# Patient Record
Sex: FEMALE | Race: BLACK OR AFRICAN AMERICAN | NOT HISPANIC OR LATINO | Employment: FULL TIME | URBAN - METROPOLITAN AREA
[De-identification: names, ages, dates, MRNs, and addresses within clinical notes are randomized per-mention and may not be internally consistent; named-entity substitution may affect disease eponyms.]

---

## 2020-07-10 PROBLEM — E66.811 OBESITY, CLASS I, BMI 30-34.9: Status: ACTIVE | Noted: 2020-07-10

## 2024-07-10 ENCOUNTER — PATIENT MESSAGE (OUTPATIENT)
Dept: ALLERGY | Facility: CLINIC | Age: 54
End: 2024-07-10
Payer: COMMERCIAL

## 2024-07-14 ENCOUNTER — ANESTHESIA EVENT (OUTPATIENT)
Dept: SURGERY | Facility: HOSPITAL | Age: 54
End: 2024-07-14
Payer: COMMERCIAL

## 2024-07-15 ENCOUNTER — HOSPITAL ENCOUNTER (OUTPATIENT)
Facility: HOSPITAL | Age: 54
Discharge: HOME | End: 2024-07-15
Attending: UROLOGY | Admitting: UROLOGY
Payer: COMMERCIAL

## 2024-07-15 ENCOUNTER — TELEPHONE (OUTPATIENT)
Dept: CARDIOLOGY | Facility: CLINIC | Age: 54
End: 2024-07-15
Payer: COMMERCIAL

## 2024-07-15 ENCOUNTER — ANESTHESIA (OUTPATIENT)
Dept: SURGERY | Facility: HOSPITAL | Age: 54
End: 2024-07-15
Payer: COMMERCIAL

## 2024-07-15 DIAGNOSIS — N20.0 KIDNEY STONE: Primary | ICD-10-CM

## 2024-07-15 PROCEDURE — 52351 CYSTOURETERO & OR PYELOSCOPE: CPT | Mod: ,,, | Performed by: UROLOGY

## 2024-07-15 PROCEDURE — 63600175 PHARM REV CODE 636 W HCPCS: Performed by: UROLOGY

## 2024-07-15 PROCEDURE — 25000003 PHARM REV CODE 250: Performed by: NURSE ANESTHETIST, CERTIFIED REGISTERED

## 2024-07-15 PROCEDURE — 37000008 HC ANESTHESIA 1ST 15 MINUTES: Performed by: UROLOGY

## 2024-07-15 PROCEDURE — C1758 CATHETER, URETERAL: HCPCS | Performed by: UROLOGY

## 2024-07-15 PROCEDURE — 71000033 HC RECOVERY, INTIAL HOUR: Performed by: UROLOGY

## 2024-07-15 PROCEDURE — 36000707: Performed by: UROLOGY

## 2024-07-15 PROCEDURE — 25000003 PHARM REV CODE 250: Performed by: ANESTHESIOLOGY

## 2024-07-15 PROCEDURE — C1747 OPTIME ENDOSCOPE, SINGLE, URINARY TRACT: HCPCS | Performed by: UROLOGY

## 2024-07-15 PROCEDURE — 63600175 PHARM REV CODE 636 W HCPCS: Performed by: NURSE ANESTHETIST, CERTIFIED REGISTERED

## 2024-07-15 PROCEDURE — C1769 GUIDE WIRE: HCPCS | Performed by: UROLOGY

## 2024-07-15 PROCEDURE — 36000706: Performed by: UROLOGY

## 2024-07-15 PROCEDURE — C1894 INTRO/SHEATH, NON-LASER: HCPCS | Performed by: UROLOGY

## 2024-07-15 PROCEDURE — C2617 STENT, NON-COR, TEM W/O DEL: HCPCS | Performed by: UROLOGY

## 2024-07-15 PROCEDURE — 37000009 HC ANESTHESIA EA ADD 15 MINS: Performed by: UROLOGY

## 2024-07-15 PROCEDURE — 63600175 PHARM REV CODE 636 W HCPCS: Performed by: ANESTHESIOLOGY

## 2024-07-15 PROCEDURE — 52332 CYSTOSCOPY AND TREATMENT: CPT | Mod: RT,,, | Performed by: UROLOGY

## 2024-07-15 PROCEDURE — 71000015 HC POSTOP RECOV 1ST HR: Performed by: UROLOGY

## 2024-07-15 DEVICE — STENT URETERAL UNIV 7FR 24CM: Type: IMPLANTABLE DEVICE | Site: URETER | Status: FUNCTIONAL

## 2024-07-15 RX ORDER — ONDANSETRON HYDROCHLORIDE 2 MG/ML
4 INJECTION, SOLUTION INTRAVENOUS ONCE AS NEEDED
Status: DISCONTINUED | OUTPATIENT
Start: 2024-07-15 | End: 2024-07-15 | Stop reason: HOSPADM

## 2024-07-15 RX ORDER — OXYCODONE AND ACETAMINOPHEN 5; 325 MG/1; MG/1
1 TABLET ORAL
Status: DISCONTINUED | OUTPATIENT
Start: 2024-07-15 | End: 2024-07-15 | Stop reason: HOSPADM

## 2024-07-15 RX ORDER — TAMSULOSIN HYDROCHLORIDE 0.4 MG/1
0.4 CAPSULE ORAL DAILY
Qty: 30 CAPSULE | Refills: 0 | Status: SHIPPED | OUTPATIENT
Start: 2024-07-15 | End: 2024-08-14

## 2024-07-15 RX ORDER — ROCURONIUM BROMIDE 10 MG/ML
INJECTION, SOLUTION INTRAVENOUS
Status: DISCONTINUED | OUTPATIENT
Start: 2024-07-15 | End: 2024-07-15

## 2024-07-15 RX ORDER — HYDROMORPHONE HYDROCHLORIDE 2 MG/ML
0.2 INJECTION, SOLUTION INTRAMUSCULAR; INTRAVENOUS; SUBCUTANEOUS EVERY 5 MIN PRN
Status: DISCONTINUED | OUTPATIENT
Start: 2024-07-15 | End: 2024-07-15 | Stop reason: HOSPADM

## 2024-07-15 RX ORDER — OXYCODONE HYDROCHLORIDE 5 MG/1
5 TABLET ORAL EVERY 6 HOURS PRN
Qty: 30 TABLET | Refills: 0 | Status: SHIPPED | OUTPATIENT
Start: 2024-07-15

## 2024-07-15 RX ORDER — LIDOCAINE HYDROCHLORIDE 20 MG/ML
INJECTION INTRAVENOUS
Status: DISCONTINUED | OUTPATIENT
Start: 2024-07-15 | End: 2024-07-15

## 2024-07-15 RX ORDER — ONDANSETRON HYDROCHLORIDE 2 MG/ML
4 INJECTION, SOLUTION INTRAVENOUS DAILY PRN
Status: DISCONTINUED | OUTPATIENT
Start: 2024-07-15 | End: 2024-07-15 | Stop reason: HOSPADM

## 2024-07-15 RX ORDER — PHENAZOPYRIDINE HYDROCHLORIDE 100 MG/1
100 TABLET, FILM COATED ORAL 3 TIMES DAILY PRN
Qty: 30 TABLET | Refills: 0 | Status: SHIPPED | OUTPATIENT
Start: 2024-07-15 | End: 2024-07-25

## 2024-07-15 RX ORDER — SUCCINYLCHOLINE CHLORIDE 20 MG/ML
INJECTION INTRAMUSCULAR; INTRAVENOUS
Status: DISCONTINUED | OUTPATIENT
Start: 2024-07-15 | End: 2024-07-15

## 2024-07-15 RX ORDER — ONDANSETRON HYDROCHLORIDE 2 MG/ML
INJECTION, SOLUTION INTRAVENOUS
Status: DISCONTINUED | OUTPATIENT
Start: 2024-07-15 | End: 2024-07-15

## 2024-07-15 RX ORDER — MIDAZOLAM HYDROCHLORIDE 1 MG/ML
INJECTION INTRAMUSCULAR; INTRAVENOUS
Status: DISCONTINUED | OUTPATIENT
Start: 2024-07-15 | End: 2024-07-15

## 2024-07-15 RX ORDER — FENTANYL CITRATE 50 UG/ML
INJECTION, SOLUTION INTRAMUSCULAR; INTRAVENOUS
Status: DISCONTINUED | OUTPATIENT
Start: 2024-07-15 | End: 2024-07-15

## 2024-07-15 RX ORDER — DEXAMETHASONE SODIUM PHOSPHATE 4 MG/ML
INJECTION, SOLUTION INTRA-ARTICULAR; INTRALESIONAL; INTRAMUSCULAR; INTRAVENOUS; SOFT TISSUE
Status: DISCONTINUED | OUTPATIENT
Start: 2024-07-15 | End: 2024-07-15

## 2024-07-15 RX ORDER — PHENYLEPHRINE HYDROCHLORIDE 10 MG/ML
INJECTION INTRAVENOUS
Status: DISCONTINUED | OUTPATIENT
Start: 2024-07-15 | End: 2024-07-15

## 2024-07-15 RX ORDER — PROPOFOL 10 MG/ML
VIAL (ML) INTRAVENOUS
Status: DISCONTINUED | OUTPATIENT
Start: 2024-07-15 | End: 2024-07-15

## 2024-07-15 RX ORDER — FENTANYL CITRATE 50 UG/ML
25 INJECTION, SOLUTION INTRAMUSCULAR; INTRAVENOUS EVERY 5 MIN PRN
Status: DISCONTINUED | OUTPATIENT
Start: 2024-07-15 | End: 2024-07-15 | Stop reason: HOSPADM

## 2024-07-15 RX ORDER — CEFAZOLIN SODIUM 1 G/3ML
INJECTION, POWDER, FOR SOLUTION INTRAMUSCULAR; INTRAVENOUS
Status: DISCONTINUED | OUTPATIENT
Start: 2024-07-15 | End: 2024-07-15

## 2024-07-15 RX ORDER — SOLIFENACIN SUCCINATE 5 MG/1
5 TABLET, FILM COATED ORAL DAILY
Qty: 30 TABLET | Refills: 0 | Status: SHIPPED | OUTPATIENT
Start: 2024-07-15 | End: 2024-08-14

## 2024-07-15 RX ORDER — MEPERIDINE HYDROCHLORIDE 25 MG/ML
12.5 INJECTION INTRAMUSCULAR; INTRAVENOUS; SUBCUTANEOUS ONCE AS NEEDED
Status: DISCONTINUED | OUTPATIENT
Start: 2024-07-15 | End: 2024-07-15 | Stop reason: HOSPADM

## 2024-07-15 RX ORDER — KETOROLAC TROMETHAMINE 30 MG/ML
15 INJECTION, SOLUTION INTRAMUSCULAR; INTRAVENOUS EVERY 6 HOURS PRN
Status: DISCONTINUED | OUTPATIENT
Start: 2024-07-15 | End: 2024-07-15 | Stop reason: HOSPADM

## 2024-07-15 RX ADMIN — PROPOFOL 150 MG: 10 INJECTION, EMULSION INTRAVENOUS at 07:07

## 2024-07-15 RX ADMIN — ROCURONIUM BROMIDE 5 MG: 10 INJECTION, SOLUTION INTRAVENOUS at 07:07

## 2024-07-15 RX ADMIN — SODIUM CHLORIDE, SODIUM LACTATE, POTASSIUM CHLORIDE, AND CALCIUM CHLORIDE: .6; .31; .03; .02 INJECTION, SOLUTION INTRAVENOUS at 06:07

## 2024-07-15 RX ADMIN — PHENYLEPHRINE HYDROCHLORIDE 100 MCG: 10 INJECTION INTRAVENOUS at 07:07

## 2024-07-15 RX ADMIN — ONDANSETRON 4 MG: 2 INJECTION INTRAMUSCULAR; INTRAVENOUS at 07:07

## 2024-07-15 RX ADMIN — FENTANYL CITRATE 100 MCG: 50 INJECTION, SOLUTION INTRAMUSCULAR; INTRAVENOUS at 07:07

## 2024-07-15 RX ADMIN — CEFAZOLIN 2 G: 330 INJECTION, POWDER, FOR SOLUTION INTRAMUSCULAR; INTRAVENOUS at 07:07

## 2024-07-15 RX ADMIN — KETOROLAC TROMETHAMINE 15 MG: 30 INJECTION, SOLUTION INTRAMUSCULAR at 08:07

## 2024-07-15 RX ADMIN — DEXAMETHASONE SODIUM PHOSPHATE 4 MG: 4 INJECTION, SOLUTION INTRA-ARTICULAR; INTRALESIONAL; INTRAMUSCULAR; INTRAVENOUS; SOFT TISSUE at 07:07

## 2024-07-15 RX ADMIN — HYDROMORPHONE HYDROCHLORIDE 0.2 MG: 2 INJECTION INTRAMUSCULAR; INTRAVENOUS; SUBCUTANEOUS at 08:07

## 2024-07-15 RX ADMIN — MIDAZOLAM HYDROCHLORIDE 2 MG: 1 INJECTION, SOLUTION INTRAMUSCULAR; INTRAVENOUS at 06:07

## 2024-07-15 RX ADMIN — LIDOCAINE HYDROCHLORIDE 50 MG: 20 INJECTION INTRAVENOUS at 07:07

## 2024-07-15 RX ADMIN — OXYCODONE HYDROCHLORIDE AND ACETAMINOPHEN 1 TABLET: 5; 325 TABLET ORAL at 08:07

## 2024-07-15 RX ADMIN — SUCCINYLCHOLINE CHLORIDE 120 MG: 20 INJECTION, SOLUTION INTRAMUSCULAR; INTRAVENOUS; PARENTERAL at 07:07

## 2024-07-15 NOTE — OP NOTE
[facility]  Operative Note    Date: 07/15/2024    Pre-Op Diagnosis: right renal stone    Post-Op Diagnosis: same    Procedure(s) Performed:   1.  Right diagnostic ureteroscopy  2.  Cystoscopy  3.  Placement of a right 7Fr x 24 cm JJ ureteral stent with strings removed  4.  Fluoro < 1 h    Specimen(s): none    Surgeon: Abdoulaye Mchugh MD    Anesthesia: General endotracheal anesthesia    Indications: Rachel Clark is a 54 y.o. female with a right renal stone, presenting for definitive stone management.  She currently does not have a JJ ureteral stent in place.      Findings: very tight proximal ureter, would not accommodate the dual lumen, access sheath, or flexible ureteroscope, stent placed without strings    Estimated Blood Loss: min    Drains: 7 Fr x 24 cm JJ ureteral stent without strings    Procedure in detail:  After informed consent was obtained, the patient was brought the the cystoscopy suite and placed in the supine position.  SCDs were applied and working.  Anesthesia was administered.  The patient was then placed in the dorsal lithotomy position and prepped and draped in the usual sterile fashion.      A rigid cystoscope in a 22 Fr sheath was introduced into the patient's urethra.  This passed easily.  The entire urethra was visualized which showed no strictures or masses.  Formal cystoscopy was performed which revealed no masses or lesions suspicious for malignancy, no bladder stones, no bladder diverticuli, no trabeculations.  The ureteral orifices were visualized in the normal anatomic position bilaterally and efflux was visualized.      A Motion wire was passed up the right ureteral orifice and up into the kidney.  This passed easily and placement was confirmed using fluoro.  The cystoscope was removed keeping the guidewire in place and the wire was secured to the drape.      A dual lumen catheter was used to gently dilate the distal ureter, this was advanced up to the  proximal ureter under fluoro. A second wire was inserted and the duel lumen was removed. A 10/12 Fr 35cm ureteral access sheath was inserted under fluoro without difficulty but could not be advanced past the midureter. A [employer] disposable ureteroscope was inserted and advanced proximally. Unfortunately, there was an area of narrowed ureter that would not accommodate the scope. The decision was made to place a stent and return for stone removal at a later date. The ureteroscope and ureteral access sheath were removed under direct vision, there was no evidence of ureteral injury or perforation. The bladder was drained with cystoscope removed.     Over the indwelling wire, a 7 Fr x 24 cm JJ ureteral stent without strings was passed over the wire and up into the renal pelvis using fluoro.  When the coil appeared to be in good position in the kidney and the radio-opaque marker of the pusher was at the inferior pubis, the wire was removed under continuous fluoro.  Good coils were seen in the kidney and the bladder using fluoro.      The patient tolerated the procedure well and was transferred to the recovery room in stable condition.      Disposition:  The patient will follow up with me in the OR for stone removal 07/31    Abdoulaye Mchugh MD

## 2024-07-15 NOTE — DISCHARGE SUMMARY
[facility] ([facility])  Discharge Note  Short Stay    Procedure(s) (LRB):  CYSTOURETEROSCOPY (Right)  STENT PLACEMENT/PRIOR TO JIMBO (Right)      OUTCOME: Patient tolerated treatment/procedure well without complication and is now ready for discharge.    DISPOSITION: Home or Self Care    FINAL DIAGNOSIS:  Kidney stone    FOLLOWUP: In clinic    DISCHARGE INSTRUCTIONS:    Discharge Procedure Orders   Diet Adult Regular     Notify your health care provider if you experience any of the following:  temperature >100.4     Notify your health care provider if you experience any of the following:  persistent nausea and vomiting or diarrhea     Notify your health care provider if you experience any of the following:  severe uncontrolled pain     Notify your health care provider if you experience any of the following:  difficulty breathing or increased cough     Notify your health care provider if you experience any of the following:  severe persistent headache     Notify your health care provider if you experience any of the following:  worsening rash     Notify your health care provider if you experience any of the following:  persistent dizziness, light-headedness, or visual disturbances     Notify your health care provider if you experience any of the following:  increased confusion or weakness     Activity as tolerated

## 2024-07-15 NOTE — ANESTHESIA POSTPROCEDURE EVALUATION
Anesthesia Post Evaluation    Patient: Rachel Clark    Procedure(s) Performed: Procedure(s) (LRB):  CYSTOURETEROSCOPY (Right)  STENT PLACEMENT/PRIOR TO JIMBO (Right)    Final Anesthesia Type: general      Patient location during evaluation: PACU  Patient participation: Yes- Able to Participate  Level of consciousness: awake and alert, oriented and awake  Post-procedure vital signs: reviewed and stable  Pain management: adequate  Airway patency: patent    PONV status at discharge: No PONV  Anesthetic complications: no      Cardiovascular status: blood pressure returned to baseline  Respiratory status: unassisted  Hydration status: euvolemic  Follow-up not needed.              Vitals Value Taken Time   /73 07/15/24 0815   Temp 36.6 °C (97.9 °F) 07/15/24 0747   Pulse 84 07/15/24 0816   Resp 19 07/15/24 0816   SpO2 94 % 07/15/24 0816   Vitals shown include unfiled device data.      No case tracking events are documented in the log.      Pain/Nasim Score: Nasim Score: 8 (7/15/2024  8:00 AM)

## 2024-07-15 NOTE — PLAN OF CARE
POC and discharge instructions discussed with patient and family, no questions noted.    
Patient  ready for surgery. Family at bedside. Belongings given to family to secure. Patient instructed on Preop Process verbalizes understanding.    
Statement Selected

## 2024-07-15 NOTE — TELEPHONE ENCOUNTER
Spoke with pt and discussed echo results. Pt verbalized understanding and will call back with any further questions or concerns.     ----- Message from Abdulkadir Byers MD sent at 7/13/2024  9:30 AM CDT -----  Echo shows normal systolic function

## 2024-07-15 NOTE — ANESTHESIA PREPROCEDURE EVALUATION
07/14/2024  Rachel Clark is a 54 y.o., female.      Pre-op Assessment    I have reviewed the Patient Summary Reports.     I have reviewed the Nursing Notes. I have reviewed the NPO Status.      Review of Systems  Anesthesia Hx:  No problems with previous Anesthesia                Hematology/Oncology:  Hematology Normal   Oncology Normal                                   Pulmonary:    Asthma                    Renal/:  Renal/ Normal                 Hepatic/GI:  Hepatic/GI Normal                 Neurological:  Neurology Normal                                      Endocrine:  Endocrine Normal            Dermatological:  Skin Normal    Psych:  Psychiatric History                  Physical Exam  General: Well nourished, Cooperative, Alert and Oriented    Airway:  Mallampati: II / II  Mouth Opening: Normal  TM Distance: Normal  Tongue: Normal  Neck ROM: Normal ROM    Dental:  Intact      Patient Active Problem List   Diagnosis    Anemia    Mild intermittent asthma without complication    Anxiety disorder    Trigger index finger of left hand    Prediabetes    Obesity, Class I, BMI 30-34.9    Chest pain    Chronic idiopathic urticaria    Chronic rhinitis    Post-nasal drip         Anesthesia Plan  Type of Anesthesia, risks & benefits discussed:    Anesthesia Type: Gen ETT, Gen Supraglottic Airway  Intra-op Monitoring Plan: Standard ASA Monitors  Post Op Pain Control Plan: multimodal analgesia  Induction:  IV  Airway Plan: Direct  Informed Consent: Informed consent signed with the Patient and all parties understand the risks and agree with anesthesia plan.  All questions answered.   ASA Score: 2  Day of Surgery Review of History & Physical: H&P Update referred to the surgeon/provider.I have interviewed and examined the patient. I have reviewed the patient's H&P dated: There are no significant changes.  H&P completed by Anesthesiologist.    Ready For Surgery From Anesthesia Perspective.     .

## 2024-07-15 NOTE — ANESTHESIA PROCEDURE NOTES
Intubation    Date/Time: 7/15/2024 7:07 AM    Performed by: Edgardo Chilel CRNA  Authorized by: Rajeev Harrison MD    Intubation:     Induction:  Intravenous    Intubated:  Postinduction    Mask Ventilation:  Easy mask    Attempts:  2    Attempted By:  CRNA    Method of Intubation:  Direct    Blade:  Kimble 2    Laryngeal View Grade: Grade III - only epiglottis visible      Attempted By (2nd Attempt):  CRNA    Method of Intubation (2nd Attempt):  Video laryngoscopy    Blade (2nd Attempt):  Bean 3    Laryngeal View Grade (2nd Attempt): Grade I - full view of cords      Difficult Airway Encountered?: No      Complications:  None    Airway Device:  Oral endotracheal tube    Airway Device Size:  7.5    Style/Cuff Inflation:  Cuffed (inflated to minimal occlusive pressure)    Tube secured:  21    Secured at:  The lips    Placement Verified By:  Capnometry    Complicating Factors:  None    Findings Post-Intubation:  BS equal bilateral

## 2024-07-15 NOTE — TRANSFER OF CARE
"Anesthesia Transfer of Care Note    Patient: Rachel Clark    Procedure(s) Performed: Procedure(s) (LRB):  CYSTOURETEROSCOPY (Right)  STENT PLACEMENT/PRIOR TO JIMBO (Right)    Patient location: PACU    Anesthesia Type: general    Transport from OR: Transported from OR on room air with adequate spontaneous ventilation    Post pain: adequate analgesia    Post assessment: no apparent anesthetic complications    Post vital signs: stable    Level of consciousness: responds to stimulation    Nausea/Vomiting: no nausea/vomiting    Complications: none    Transfer of care protocol was followed      Last vitals: Visit Vitals  /69 (BP Location: Right arm, Patient Position: Sitting)   Pulse 86   Temp 36.3 °C (97.3 °F) (Temporal)   Resp 18   Ht 5' 2" (1.575 m)   Wt 84.9 kg (187 lb 2.7 oz)   LMP 06/01/2014   SpO2 99%   Breastfeeding No   BMI 34.23 kg/m²     "

## 2024-07-15 NOTE — H&P
Chief Complaint:  Right flank pain     HPI:   Rachel Clark is a 53 y.o. female that presents today as a referral from [facility] for right flank pain.  Patient notes she began having right-sided flank pain and kind of a bulge feeling about three weeks ago.  Patient notes that pain would last for about a 2nd and then resolve without further intervention.  Notes that it would occur when she moves around.  No new voiding issues or gross hematuria.  KUB showed a calcification overlying the right kidney.  No nausea or vomiting.  No family history of kidney stones or  cancers.        PMH:       Past Medical History:   Diagnosis Date    Allergy      Anemia      Anxiety      Asthma      Bronchitis      Colon polyp     Fibroids      Vertigo           PSH:        Past Surgical History:   Procedure Laterality Date    ADENOIDECTOMY        BREAST BIOPSY Left      negative     SECTION         x 1    COLONOSCOPY N/A 2020     Procedure: COLONOSCOPY;  Surgeon: Marcus Calderon MD;  Location: [facility];  Service: Endoscopy;  Laterality: N/A;    HYSTERECTOMY         [facility] 2014 for Fibroids    TONSILLECTOMY             Family History:         Family History   Problem Relation Name Age of Onset    Hypertension Mother        Breast cancer Cousin        Stroke Father        Cancer Father             lung    Ovarian cancer Maternal Cousin             Social History:  Social History   Social History            Tobacco Use    Smoking status: Never       Passive exposure: Never    Smokeless tobacco: Never   Substance Use Topics    Alcohol use: No    Drug use: No            Review of Systems:  General: No fever, chills  Skin: No rashes  Chest:  Denies cough and sputum production  Heart: Denies chest pain  Resp: Denies dyspnea  Abdomen: Denies diarrhea, abdominal pain, hematemesis, or blood in stool.  Musculoskeletal: No joint stiffness or swelling. Denies back pain.  : see HPI  Neuro: no dizziness or  weakness     Allergies:  Patient has no known allergies.     Medications:    Current Medications      Current Outpatient Medications:     albuterol (VENTOLIN HFA) 90 mcg/actuation inhaler, Inhale 1-2 puffs into the lungs every 4 to 6 hours as needed for Wheezing or Shortness of Breath. Rescue, Disp: 18 g, Rfl: 1    famotidine (PEPCID) 20 MG tablet, Take 1 tablet (20 mg total) by mouth 2 (two) times daily., Disp: 30 tablet, Rfl: 0    fexofenadine (ALLEGRA) 180 MG tablet, Take 1 tablet (180 mg total) by mouth once daily., Disp: 14 tablet, Rfl: 0    hydrOXYzine pamoate (VISTARIL) 25 MG Cap, Take 1 capsule (25 mg total) by mouth nightly as needed (skin itching)., Disp: 30 capsule, Rfl: 0        Physical Exam:      Vitals:     05/30/24 1517   BP: 106/71   Pulse: 79   Resp: 16      There is no height or weight on file to calculate BMI.  General: awake, alert, cooperative  Head: NC/AT  Ears: external ears normal  Eyes: sclera normal  Lungs: normal inspiration, NAD  Heart: well-perfused  Abdomen: Soft, NT, ND  Back: No CVA TTP, no spine TTP  Skin: The skin is warm and dry  Ext: No c/c/e.  Neuro: grossly intact, AOx3     RADIOLOGY:  XR ABDOMEN FLAT AND ERECT   05/27/2024     CLINICAL HISTORY:  Unspecified abdominal pain     TECHNIQUE:  Flat and erect AP views of the abdomen were preformed.     COMPARISON:  07/25/2019     FINDINGS:  Bowel gas pattern is nonspecific.  A moderate volume of stool is present throughout the colon suggesting possible constipation.  No definite evidence of obstruction.  No pneumoperitoneum demonstrated. There is a 10 mm calcific density projecting over the expected location of the right renal hilum which is suspicious for stone, potentially involving the UPJ.  Visualized lung bases appear clear.     Impression:     1. Suspected right-sided nephrolithiasis as above.  Further characterization could be obtained with CT.  2. Possible constipation      CT RENAL STONE STUDY ABD PELVIS WO     CLINICAL  HISTORY:  Flank pain, kidney stone suspected; Unspecified abdominal pain     TECHNIQUE:  Low dose axial images, sagittal and coronal reformations were obtained from the lung bases to the pubic symphysis.  Oral contrast was not administered.     COMPARISON:  05/27/2024     FINDINGS:  Heart: Normal size. No effusion.     Lung Bases: Clear.     Liver: Normal size and attenuation. No focal lesions.     Gallbladder: No calcified gallstones.     Bile Ducts: No dilatation.     Pancreas: No obvious mass. No peripancreatic fat stranding.     Spleen: Normal.     Adrenals: Normal.     Kidneys/Ureters: Mild cortical thinning.  Right kidney appears somewhat atrophic.  Suspect cyst upper pole right kidney not well visualized.  Non mm nonobstructing stone within the lower pole right kidney.  No mass, hydroureteronephrosis, or nephroureterolithiasis.     Bladder: No wall thickening.     Reproductive organs: Normal.     GI Tract/Mesentery: No evidence of bowel obstruction or inflammation.  No evidence of appendicitis.     Peritoneal Space: No ascites or free air.     Retroperitoneum: No significant adenopathy.     Abdominal wall: Small fat containing umbilical hernia.     Vasculature: No aneurysm.     Bones: No acute fracture.  Mild facet arthropathy lower lumbar spine.  No suspicious lytic or sclerotic lesions.     Impression:     No acute abnormalities     LABS:  I personally reviewed the following lab values:        Lab Results   Component Value Date     WBC 7.81 05/27/2024     HGB 11.1 (L) 05/27/2024     HCT 35.6 (L) 05/27/2024      05/27/2024      05/27/2024     K 3.9 05/27/2024      05/27/2024     CREATININE 1.0 05/27/2024     BUN 17 05/27/2024     CO2 26 05/27/2024     TSH 2.845 09/28/2023     GLUF 86 04/16/2008     HGBA1C 6.0 (H) 04/24/2024     CHOL 165 09/28/2023     TRIG 76 09/28/2023     HDL 41 09/28/2023     ALT 16 05/27/2024     AST 14 05/27/2024      Assessment/Plan:   Rachel Clark is a 54  y.o. female with:     Right renal stone - to OR for right URS/LL, risks/benefits/alternatives reviewed        Abdoulaye Mchugh MD  Urology

## 2024-07-16 VITALS
TEMPERATURE: 98 F | SYSTOLIC BLOOD PRESSURE: 122 MMHG | WEIGHT: 187.19 LBS | OXYGEN SATURATION: 94 % | HEART RATE: 81 BPM | HEIGHT: 62 IN | DIASTOLIC BLOOD PRESSURE: 80 MMHG | RESPIRATION RATE: 30 BRPM | BODY MASS INDEX: 34.45 KG/M2

## 2024-07-26 ENCOUNTER — TELEPHONE (OUTPATIENT)
Dept: UROLOGY | Facility: CLINIC | Age: 54
End: 2024-07-26
Payer: COMMERCIAL

## 2024-07-26 NOTE — TELEPHONE ENCOUNTER
Spoke with her, fever and chills come and go (100.7,102.101), she is rotating Tylenol and Motrin, I advised her to go to ER.     ----- Message from Wellington Dee MD sent at 7/26/2024 12:37 PM CDT -----  Contact: Rachel  If febrile should be seen, and may need to come to ER.  How high is the temp?  ----- Message -----  From: Milanes Flores, Loraine, RN  Sent: 7/26/2024  11:11 AM CDT  To: Wellington Dee MD    Patient had surgery on July 15 by Dr Mchugh (CYSTOURETEROSCOPY), and she is having fever, chills and sharp pain, any other reason beside the stent placement?   Thanks!  ----- Message -----  From: Layo See  Sent: 7/26/2024   9:58 AM CDT  To: Jeison ZUÑIGA Staff    Type:  Needs Medical Advice    Who Called: Rachel  Symptoms (please be specific):  sharp pain,chills and fever    How long has patient had these symptoms: started yesterday 7/26  Pharmacy name and phone #:   [facility] [address] [place], [place] - [address] [facility]  [address] [facility] [address]  Phone: [phone] Fax: [phone]  Would the patient rather a call back or a response via [facility]? Call back  Best Call Back Number:  [phone]  Additional Information: Patient wants to know if its related to her recent surgery  Thanks   Am

## 2024-07-31 ENCOUNTER — TELEPHONE (OUTPATIENT)
Dept: UROLOGY | Facility: CLINIC | Age: 54
End: 2024-07-31
Payer: COMMERCIAL

## 2024-07-31 DIAGNOSIS — N20.0 RENAL STONE: ICD-10-CM

## 2024-07-31 DIAGNOSIS — N20.0 KIDNEY STONE: Primary | ICD-10-CM

## 2024-07-31 RX ORDER — CEFAZOLIN SODIUM 2 G/50ML
2 SOLUTION INTRAVENOUS
Status: CANCELLED | OUTPATIENT
Start: 2024-07-31

## 2024-07-31 NOTE — TELEPHONE ENCOUNTER
Message sent to Dr Mchugh       ----- Message from Layne Hightower sent at 7/31/2024  3:26 PM CDT -----  Contact: Rachel Godwin is calling to receive a call back at .[phone]. Reports having surgery on 7/15 and the surgery was supposed to be repeated. Pt checking on status of procedure scheduling.

## 2024-07-31 NOTE — TELEPHONE ENCOUNTER
Called the patient, after verification of name and , the patient was informed  that about sx date. She agreed. Pt informed that pre admit will call with a time of arrival. She voiced understanding       ----- Message from Penemarie K Murphy Jaylan sent at 2024  3:51 PM CDT -----  Regarding: self  Type: Patient Call Back       Who called: joselin        What is the request in detail: wouldl colleen a call back        Can the clinic reply by [facility]? yes      Would the patient rather a call back or a response via [facility]? Call back       Best call back number:[phone]

## 2024-08-01 ENCOUNTER — TELEPHONE (OUTPATIENT)
Dept: UROLOGY | Facility: CLINIC | Age: 54
End: 2024-08-01
Payer: COMMERCIAL

## 2024-08-01 ENCOUNTER — TELEPHONE (OUTPATIENT)
Dept: PREADMISSION TESTING | Facility: HOSPITAL | Age: 54
End: 2024-08-01
Payer: COMMERCIAL

## 2024-08-01 NOTE — TELEPHONE ENCOUNTER
Called the patient, after verification of name and , the patient was informed  that a letter for her job was put in on her portal she voiced understanding         ----- Message from Milton Roberto sent at 2024  3:02 PM CDT -----  Contact: Rachel  .Type:  Needs Medical Advice    Who Called:  Rachel     Would the patient rather a call back or a response via [facility]?  Call back   Best Call Back Number:  [phone] (home)    Additional Information:  Pt would like a call back to discuss questions pertaining to the upcoming procedure      Thanks

## 2024-08-01 NOTE — LETTER
August 1, 2024    Rachel Clark  [address] [place] [address]             [facility]  [address] [facility]  [place] [address]  Phone: [phone]  Fax: [phone] Rachel Clakr will be having surgery at [facility] on 8/2/24. Due to being booked up for a few weeks and not having OR time available on my schedule, I was able to get Ms Ramos scheduled on a day that became available unexpectedly due to cancellations. It is  important for her to have this surgery sooner rather than later and this date (8/2) was offered to her.      If you have any questions or concerns, please don't hesitate to call.    Sincerely,        Abdoulaye Mchugh MD

## 2024-08-01 NOTE — TELEPHONE ENCOUNTER
Pre op instructions reviewed with patient over telephone, verbalized understanding.     To confirm, Surgery is scheduled on 8/02/24, please arrive at 1:15pm. We will call you after 2pm the day before Surgery with your arrival time.     *Please report to the [facility]obby (1st Floor) located off of [facility] (2nd Entrance/Building on the left, in front of the flag pole).  Address: [address] [place] [address] [place] [address]      INSTRUCTIONS IMPORTANT!!!  DO NOT Eat, Drink, or Smoke after 12 midnight unless instructed otherwise by your Surgeon. OK to brush teeth, no gum, candy or mints!     >>>MEDICATION INSTRUCTIONS<<<: Morning of Surgery, take small sip of water with ONLY these medications:  FLONASE Nasal [place], if needed    >>>>BRING ALBUTEROL INHALER TO PRE OP DAY OF SURGERY<<<     If you take Ozempic/ Mounjaro / Wegovy / Trulicity / Semaglutide, any weight loss injections OR PHENTERMINE --->>> PLEASE LET US KNOW IMMEDIATELY, as these medications need to be stopped 7 days prior to surgery!    *Patients should HOLD all vitamins, herbal supplements, weight loss medication, aspirin products & NSAIDS 7 days prior to surgery, as these can thin the blood. Ok to take Tylenol.     ____  Avoid Alcoholic beverages 3 days prior to surgery, as it can thin the blood.  ____  NO Acrylic/fake nails or nail polish worn day of surgery (specifically hand/arm & foot surgeries).  ____  NO powder, lotions, deodorants, oils or cream on body.  ____  Remove all jewelry & piercings & foreign objects before arrival & leave at home.  ____  Remove Dentures, Hearing Aids & Contact Lens prior to surgery.  ____  Bring photo ID and insurance information to hospital (Leave Valuables at Home).  ____  If going home the same day, arrange for a ride home. You will not be able to drive for 24 hrs if Anesthesia was used.   ____  Females (ages 11-60): may need to give a urine sample the morning of surgery; please see Pre op Nurse prior to  using the restroom.  ____  Males: Stop ED medications (Viagra, Cialis) 24 hrs prior to surgery.  ____  Wear clean, loose fitting clothing to allow for dressings/ bandages.        Bathing Instructions:               -Shower with anti-bacterial Soap (Hibiclens or Dial) the night before surgery and the morning of.              -Do not use Hibiclens on your face or genitals.              -Apply clean clothes after shower.  -Do not shave your face or body 2 days prior to surgery unless instructed otherwise by your Surgeon.  -Do not shave pubic hair 7 days prior to surgery (gyn pt's).     [facility] Vis[facility]/Ride Policy:  Only 2 adults allowed in pre op/recovery area during your procedure. You MUST HAVE A RIDE HOME from a responsible adult that you know and trust. Medical Transport, Uber or Lyft can ONLY be used if patient has a responsible adult to accompany them during ride home.       *Signs and symptoms of Infection Before or After Surgery:               !!!If you experience any fever, chills, nausea/ vomiting, foul odor/ excessive drainage from surgical site, flu-like symptoms, new wounds or cuts, PLEASE CALL THE SURGEON OFFICE at [phone] or SEND MESSAGE THROUGH [employer] PORTAL!!!     *If you are running late the morning of surgery, please call the Hospital Surgery Dept @ [phone].     *Billing questions:  [phone]      Thank you,  -[facility] Pre Admit Dept.  [phone]   or [phone]  M-F 7:30 am-4:00 pm (Closed Major Holidays)

## 2024-08-02 ENCOUNTER — ANESTHESIA EVENT (OUTPATIENT)
Dept: SURGERY | Facility: HOSPITAL | Age: 54
End: 2024-08-02
Payer: COMMERCIAL

## 2024-08-02 ENCOUNTER — HOSPITAL ENCOUNTER (OUTPATIENT)
Facility: HOSPITAL | Age: 54
Discharge: HOME | End: 2024-08-02
Attending: UROLOGY | Admitting: UROLOGY
Payer: COMMERCIAL

## 2024-08-02 ENCOUNTER — ANESTHESIA (OUTPATIENT)
Dept: SURGERY | Facility: HOSPITAL | Age: 54
End: 2024-08-02
Payer: COMMERCIAL

## 2024-08-02 DIAGNOSIS — N20.0 KIDNEY STONE: ICD-10-CM

## 2024-08-02 DIAGNOSIS — N20.0 RENAL STONE: Primary | ICD-10-CM

## 2024-08-02 PROCEDURE — 88300 SURGICAL PATH GROSS: CPT | Performed by: STUDENT IN AN ORGANIZED HEALTH CARE EDUCATION/TRAINING PROGRAM

## 2024-08-02 PROCEDURE — 27201423 OPTIME MED/SURG SUP & DEVICES STERILE SUPPLY: Performed by: UROLOGY

## 2024-08-02 PROCEDURE — 25000242 PHARM REV CODE 250 ALT 637 W/ HCPCS

## 2024-08-02 PROCEDURE — 37000009 HC ANESTHESIA EA ADD 15 MINS: Performed by: UROLOGY

## 2024-08-02 PROCEDURE — 71000015 HC POSTOP RECOV 1ST HR: Performed by: UROLOGY

## 2024-08-02 PROCEDURE — 63600175 PHARM REV CODE 636 W HCPCS

## 2024-08-02 PROCEDURE — 71000033 HC RECOVERY, INTIAL HOUR: Performed by: UROLOGY

## 2024-08-02 PROCEDURE — 52356 CYSTO/URETERO W/LITHOTRIPSY: CPT | Mod: RT,,, | Performed by: UROLOGY

## 2024-08-02 PROCEDURE — C1894 INTRO/SHEATH, NON-LASER: HCPCS | Performed by: UROLOGY

## 2024-08-02 PROCEDURE — C1769 GUIDE WIRE: HCPCS | Performed by: UROLOGY

## 2024-08-02 PROCEDURE — 37000008 HC ANESTHESIA 1ST 15 MINUTES: Performed by: UROLOGY

## 2024-08-02 PROCEDURE — C2617 STENT, NON-COR, TEM W/O DEL: HCPCS | Performed by: UROLOGY

## 2024-08-02 PROCEDURE — 36000706: Performed by: UROLOGY

## 2024-08-02 PROCEDURE — 82365 CALCULUS SPECTROSCOPY: CPT | Performed by: UROLOGY

## 2024-08-02 PROCEDURE — 36000707: Performed by: UROLOGY

## 2024-08-02 PROCEDURE — 63600175 PHARM REV CODE 636 W HCPCS: Performed by: UROLOGY

## 2024-08-02 PROCEDURE — 25000003 PHARM REV CODE 250

## 2024-08-02 PROCEDURE — C1747 OPTIME ENDOSCOPE, SINGLE, URINARY TRACT: HCPCS | Performed by: UROLOGY

## 2024-08-02 PROCEDURE — 25000003 PHARM REV CODE 250: Performed by: UROLOGY

## 2024-08-02 DEVICE — STENT URETERAL UNIV 6FR 24CM: Type: IMPLANTABLE DEVICE | Site: URETER | Status: FUNCTIONAL

## 2024-08-02 RX ORDER — FENTANYL CITRATE 50 UG/ML
25 INJECTION, SOLUTION INTRAMUSCULAR; INTRAVENOUS EVERY 5 MIN PRN
Status: DISCONTINUED | OUTPATIENT
Start: 2024-08-02 | End: 2024-08-02 | Stop reason: HOSPADM

## 2024-08-02 RX ORDER — LIDOCAINE HYDROCHLORIDE 10 MG/ML
INJECTION, SOLUTION EPIDURAL; INFILTRATION; INTRACAUDAL; PERINEURAL
Status: DISCONTINUED | OUTPATIENT
Start: 2024-08-02 | End: 2024-08-02

## 2024-08-02 RX ORDER — NITROFURANTOIN 25; 75 MG/1; MG/1
100 CAPSULE ORAL 2 TIMES DAILY
Qty: 10 CAPSULE | Refills: 0 | Status: SHIPPED | OUTPATIENT
Start: 2024-08-02 | End: 2024-08-07

## 2024-08-02 RX ORDER — MIDAZOLAM HYDROCHLORIDE 1 MG/ML
INJECTION INTRAMUSCULAR; INTRAVENOUS
Status: DISCONTINUED | OUTPATIENT
Start: 2024-08-02 | End: 2024-08-02

## 2024-08-02 RX ORDER — FENTANYL CITRATE 50 UG/ML
INJECTION, SOLUTION INTRAMUSCULAR; INTRAVENOUS
Status: DISCONTINUED | OUTPATIENT
Start: 2024-08-02 | End: 2024-08-02

## 2024-08-02 RX ORDER — ONDANSETRON HYDROCHLORIDE 2 MG/ML
INJECTION, SOLUTION INTRAVENOUS
Status: DISCONTINUED | OUTPATIENT
Start: 2024-08-02 | End: 2024-08-02

## 2024-08-02 RX ORDER — ROCURONIUM BROMIDE 10 MG/ML
INJECTION, SOLUTION INTRAVENOUS
Status: DISCONTINUED | OUTPATIENT
Start: 2024-08-02 | End: 2024-08-02

## 2024-08-02 RX ORDER — ONDANSETRON HYDROCHLORIDE 2 MG/ML
4 INJECTION, SOLUTION INTRAVENOUS ONCE AS NEEDED
Status: DISCONTINUED | OUTPATIENT
Start: 2024-08-02 | End: 2024-08-02 | Stop reason: HOSPADM

## 2024-08-02 RX ORDER — SODIUM CHLORIDE, SODIUM LACTATE, POTASSIUM CHLORIDE, CALCIUM CHLORIDE 600; 310; 30; 20 MG/100ML; MG/100ML; MG/100ML; MG/100ML
INJECTION, SOLUTION INTRAVENOUS CONTINUOUS PRN
Status: DISCONTINUED | OUTPATIENT
Start: 2024-08-02 | End: 2024-08-02

## 2024-08-02 RX ORDER — HYDROMORPHONE HYDROCHLORIDE 2 MG/ML
0.2 INJECTION, SOLUTION INTRAMUSCULAR; INTRAVENOUS; SUBCUTANEOUS EVERY 5 MIN PRN
Status: DISCONTINUED | OUTPATIENT
Start: 2024-08-02 | End: 2024-08-02 | Stop reason: HOSPADM

## 2024-08-02 RX ORDER — OXYCODONE AND ACETAMINOPHEN 5; 325 MG/1; MG/1
1 TABLET ORAL
Status: DISCONTINUED | OUTPATIENT
Start: 2024-08-02 | End: 2024-08-02 | Stop reason: HOSPADM

## 2024-08-02 RX ORDER — PHENYLEPHRINE HYDROCHLORIDE 10 MG/ML
INJECTION INTRAVENOUS
Status: DISCONTINUED | OUTPATIENT
Start: 2024-08-02 | End: 2024-08-02

## 2024-08-02 RX ORDER — MEPERIDINE HYDROCHLORIDE 25 MG/ML
12.5 INJECTION INTRAMUSCULAR; INTRAVENOUS; SUBCUTANEOUS ONCE AS NEEDED
Status: DISCONTINUED | OUTPATIENT
Start: 2024-08-02 | End: 2024-08-02 | Stop reason: HOSPADM

## 2024-08-02 RX ORDER — DEXAMETHASONE SODIUM PHOSPHATE 4 MG/ML
INJECTION, SOLUTION INTRA-ARTICULAR; INTRALESIONAL; INTRAMUSCULAR; INTRAVENOUS; SOFT TISSUE
Status: DISCONTINUED | OUTPATIENT
Start: 2024-08-02 | End: 2024-08-02

## 2024-08-02 RX ORDER — PROPOFOL 10 MG/ML
VIAL (ML) INTRAVENOUS
Status: DISCONTINUED | OUTPATIENT
Start: 2024-08-02 | End: 2024-08-02

## 2024-08-02 RX ORDER — OXYCODONE HYDROCHLORIDE 5 MG/1
5 TABLET ORAL EVERY 6 HOURS PRN
Qty: 15 TABLET | Refills: 0 | Status: SHIPPED | OUTPATIENT
Start: 2024-08-02

## 2024-08-02 RX ORDER — ONDANSETRON HYDROCHLORIDE 2 MG/ML
4 INJECTION, SOLUTION INTRAVENOUS DAILY PRN
Status: DISCONTINUED | OUTPATIENT
Start: 2024-08-02 | End: 2024-08-02 | Stop reason: HOSPADM

## 2024-08-02 RX ORDER — ALBUTEROL SULFATE 90 UG/1
INHALANT RESPIRATORY (INHALATION)
Status: DISCONTINUED | OUTPATIENT
Start: 2024-08-02 | End: 2024-08-02

## 2024-08-02 RX ADMIN — ALBUTEROL SULFATE 8 PUFF: 90 AEROSOL, METERED RESPIRATORY (INHALATION) at 03:08

## 2024-08-02 RX ADMIN — FENTANYL CITRATE 50 MCG: 50 INJECTION, SOLUTION INTRAMUSCULAR; INTRAVENOUS at 02:08

## 2024-08-02 RX ADMIN — ROCURONIUM BROMIDE 50 MG: 10 SOLUTION INTRAVENOUS at 02:08

## 2024-08-02 RX ADMIN — DEXAMETHASONE SODIUM PHOSPHATE 8 MG: 4 INJECTION, SOLUTION INTRA-ARTICULAR; INTRALESIONAL; INTRAMUSCULAR; INTRAVENOUS; SOFT TISSUE at 02:08

## 2024-08-02 RX ADMIN — MIDAZOLAM HYDROCHLORIDE 2 MG: 1 INJECTION, SOLUTION INTRAMUSCULAR; INTRAVENOUS at 02:08

## 2024-08-02 RX ADMIN — CEFAZOLIN 2 G: 2 INJECTION, POWDER, FOR SOLUTION INTRAMUSCULAR; INTRAVENOUS at 02:08

## 2024-08-02 RX ADMIN — ONDANSETRON 4 MG: 2 INJECTION INTRAMUSCULAR; INTRAVENOUS at 03:08

## 2024-08-02 RX ADMIN — PHENYLEPHRINE HYDROCHLORIDE 100 MCG: 10 INJECTION INTRAVENOUS at 02:08

## 2024-08-02 RX ADMIN — PROPOFOL 150 MG: 10 INJECTION, EMULSION INTRAVENOUS at 02:08

## 2024-08-02 RX ADMIN — SUGAMMADEX 200 MG: 100 INJECTION, SOLUTION INTRAVENOUS at 03:08

## 2024-08-02 RX ADMIN — SODIUM CHLORIDE, SODIUM LACTATE, POTASSIUM CHLORIDE, AND CALCIUM CHLORIDE: 600; 310; 30; 20 INJECTION, SOLUTION INTRAVENOUS at 02:08

## 2024-08-02 RX ADMIN — LIDOCAINE HYDROCHLORIDE 50 MG: 10 SOLUTION INTRAVENOUS at 02:08

## 2024-08-04 ENCOUNTER — PATIENT MESSAGE (OUTPATIENT)
Dept: UROLOGY | Facility: CLINIC | Age: 54
End: 2024-08-04
Payer: COMMERCIAL

## 2024-08-05 VITALS
RESPIRATION RATE: 16 BRPM | TEMPERATURE: 98 F | BODY MASS INDEX: 34.14 KG/M2 | OXYGEN SATURATION: 99 % | SYSTOLIC BLOOD PRESSURE: 123 MMHG | HEIGHT: 62 IN | DIASTOLIC BLOOD PRESSURE: 74 MMHG | WEIGHT: 185.5 LBS | HEART RATE: 67 BPM

## 2024-08-05 LAB — SPECIMEN SOURCE: NORMAL

## 2024-08-06 LAB
FINAL PATHOLOGIC DIAGNOSIS: NORMAL
GROSS: NORMAL
Lab: NORMAL

## 2024-10-07 PROBLEM — R09.82 POST-NASAL DRIP: Status: RESOLVED | Noted: 2024-06-27 | Resolved: 2024-10-07

## 2024-10-25 ENCOUNTER — LAB VISIT (OUTPATIENT)
Dept: LAB | Facility: HOSPITAL | Age: 54
End: 2024-10-25
Attending: INTERNAL MEDICINE
Payer: COMMERCIAL

## 2024-10-25 ENCOUNTER — OFFICE VISIT (OUTPATIENT)
Dept: FAMILY MEDICINE | Facility: CLINIC | Age: 54
End: 2024-10-25
Payer: COMMERCIAL

## 2024-10-25 VITALS
DIASTOLIC BLOOD PRESSURE: 78 MMHG | RESPIRATION RATE: 17 BRPM | HEART RATE: 104 BPM | BODY MASS INDEX: 34.59 KG/M2 | SYSTOLIC BLOOD PRESSURE: 112 MMHG | WEIGHT: 187.94 LBS | HEIGHT: 62 IN | TEMPERATURE: 97 F | OXYGEN SATURATION: 97 %

## 2024-10-25 DIAGNOSIS — Z00.00 ANNUAL PHYSICAL EXAM: Primary | ICD-10-CM

## 2024-10-25 DIAGNOSIS — Z00.00 ANNUAL PHYSICAL EXAM: ICD-10-CM

## 2024-10-25 DIAGNOSIS — R73.03 PREDIABETES: ICD-10-CM

## 2024-10-25 DIAGNOSIS — L50.8 CHRONIC URTICARIA: ICD-10-CM

## 2024-10-25 DIAGNOSIS — E66.811 OBESITY, CLASS I, BMI 30-34.9: ICD-10-CM

## 2024-10-25 LAB
ALBUMIN SERPL BCP-MCNC: 3.9 G/DL (ref 3.5–5.2)
ALP SERPL-CCNC: 102 U/L (ref 40–150)
ALT SERPL W/O P-5'-P-CCNC: 13 U/L (ref 10–44)
ANION GAP SERPL CALC-SCNC: 13 MMOL/L (ref 8–16)
AST SERPL-CCNC: 14 U/L (ref 10–40)
BASOPHILS # BLD AUTO: 0.08 K/UL (ref 0–0.2)
BASOPHILS NFR BLD: 1 % (ref 0–1.9)
BILIRUB SERPL-MCNC: 0.4 MG/DL (ref 0.1–1)
BUN SERPL-MCNC: 14 MG/DL (ref 6–20)
CALCIUM SERPL-MCNC: 10.3 MG/DL (ref 8.7–10.5)
CHLORIDE SERPL-SCNC: 107 MMOL/L (ref 95–110)
CHOLEST SERPL-MCNC: 167 MG/DL (ref 120–199)
CHOLEST/HDLC SERPL: 3.6 {RATIO} (ref 2–5)
CO2 SERPL-SCNC: 22 MMOL/L (ref 23–29)
CREAT SERPL-MCNC: 1.1 MG/DL (ref 0.5–1.4)
DIFFERENTIAL METHOD BLD: ABNORMAL
EOSINOPHIL # BLD AUTO: 0.3 K/UL (ref 0–0.5)
EOSINOPHIL NFR BLD: 3.7 % (ref 0–8)
ERYTHROCYTE [DISTWIDTH] IN BLOOD BY AUTOMATED COUNT: 15.1 % (ref 11.5–14.5)
EST. GFR  (NO RACE VARIABLE): 59.7 ML/MIN/1.73 M^2
ESTIMATED AVG GLUCOSE: 120 MG/DL (ref 68–131)
GLUCOSE SERPL-MCNC: 104 MG/DL (ref 70–110)
GLUCOSE SERPL-MCNC: 107 MG/DL (ref 70–110)
HBA1C MFR BLD: 5.8 % (ref 4–5.6)
HCT VFR BLD AUTO: 37.1 % (ref 37–48.5)
HDLC SERPL-MCNC: 46 MG/DL (ref 40–75)
HDLC SERPL: 27.5 % (ref 20–50)
HGB BLD-MCNC: 11.4 G/DL (ref 12–16)
IMM GRANULOCYTES # BLD AUTO: 0.02 K/UL (ref 0–0.04)
IMM GRANULOCYTES NFR BLD AUTO: 0.3 % (ref 0–0.5)
LDLC SERPL CALC-MCNC: 102.4 MG/DL (ref 63–159)
LYMPHOCYTES # BLD AUTO: 3.2 K/UL (ref 1–4.8)
LYMPHOCYTES NFR BLD: 40.7 % (ref 18–48)
MCH RBC QN AUTO: 26.3 PG (ref 27–31)
MCHC RBC AUTO-ENTMCNC: 30.7 G/DL (ref 32–36)
MCV RBC AUTO: 86 FL (ref 82–98)
MONOCYTES # BLD AUTO: 0.8 K/UL (ref 0.3–1)
MONOCYTES NFR BLD: 9.5 % (ref 4–15)
NEUTROPHILS # BLD AUTO: 3.6 K/UL (ref 1.8–7.7)
NEUTROPHILS NFR BLD: 44.8 % (ref 38–73)
NONHDLC SERPL-MCNC: 121 MG/DL
NRBC BLD-RTO: 0 /100 WBC
PLATELET # BLD AUTO: 362 K/UL (ref 150–450)
PMV BLD AUTO: 10.6 FL (ref 9.2–12.9)
POTASSIUM SERPL-SCNC: 4.1 MMOL/L (ref 3.5–5.1)
PROT SERPL-MCNC: 7.6 G/DL (ref 6–8.4)
RBC # BLD AUTO: 4.33 M/UL (ref 4–5.4)
SODIUM SERPL-SCNC: 142 MMOL/L (ref 136–145)
TRIGL SERPL-MCNC: 93 MG/DL (ref 30–150)
TSH SERPL DL<=0.005 MIU/L-ACNC: 2.85 UIU/ML (ref 0.4–4)
WBC # BLD AUTO: 7.91 K/UL (ref 3.9–12.7)

## 2024-10-25 PROCEDURE — 85025 COMPLETE CBC W/AUTO DIFF WBC: CPT | Performed by: INTERNAL MEDICINE

## 2024-10-25 PROCEDURE — 84443 ASSAY THYROID STIM HORMONE: CPT | Performed by: INTERNAL MEDICINE

## 2024-10-25 PROCEDURE — 83036 HEMOGLOBIN GLYCOSYLATED A1C: CPT | Performed by: INTERNAL MEDICINE

## 2024-10-25 PROCEDURE — 99999 PR PBB SHADOW E&M-EST. PATIENT-LVL III: CPT | Mod: PBBFAC,,, | Performed by: INTERNAL MEDICINE

## 2024-10-25 PROCEDURE — 80061 LIPID PANEL: CPT | Performed by: INTERNAL MEDICINE

## 2024-10-25 PROCEDURE — 80053 COMPREHEN METABOLIC PANEL: CPT | Performed by: INTERNAL MEDICINE

## 2024-10-25 NOTE — PROGRESS NOTES
Patient ID: Rachel Clark is a 54 y.o. female.    Chief Complaint: Urinary Frequency and Polydipsia (Symptoms have been for a few months now)      HPI  History of Present Illness    - Ms. Clark presents with increased thirst, fatigue, nausea, and changes in urination, concerned about possible progression of prediabetes.    Ms. Clark reports increased thirst, particularly at night, for the past couple of months. She feels fatigued and nauseated when consuming certain foods. For approximately the last week, the patient has noticed her urine has been clear. She expresses concern about these symptoms in relation to her prediabetic condition.    Ms. Clark reports hives and lip swelling. She recently consulted an allergist who diagnosed her with chronic hives and recommended Allegra. She is under the care of a rheumatologist for joint pain, and mentions that lupus was previously considered as a potential diagnosis. Ms. Clark also reports significant fatigue, which she partially attributes to having a new supervisor at work, suggesting increased stress levels.    Ms. Clark denies checking her blood sugar at home.      ROS:  General: denies fever, denies chills, complains of fatigue, complains of fatigue, denies weight gain, denies weight loss  Eyes: denies vision changes, denies redness, denies discharge  ENT: denies ear pain, denies nasal congestion, denies sore throat  Cardiovascular: denies chest pain, denies palpitations, denies lower extremity edema  Respiratory: denies cough, denies shortness of breath  Gastrointestinal: denies abdominal pain, complains of nausea, denies vomiting, denies diarrhea, denies constipation, denies blood in stool  Genitourinary: denies dysuria, denies hematuria, denies frequency  Musculoskeletal: complains of joint pain, denies muscle pain  Skin: denies rash, denies lesion  Neurological: denies headache, denies dizziness, denies numbness, denies tingling  Psychiatric:  denies anxiety, denies depression, denies sleep difficulty  Endocrine: complains of excessive thirst  Allergic: complains of allergic reactions                   Objective       Current Outpatient Medications:     albuterol (PROVENTIL HFA) 90 mcg/actuation inhaler, Inhale 2 puffs into the lungs every 6 (six) hours as needed for Wheezing. Rescue, Disp: 18 g, Rfl: 0    azelastine (ASTELIN) 137 mcg (0.1 %) nasal spray, 1 spray (137 mcg total) by Nasal route 2 (two) times daily., Disp: 30 mL, Rfl: 11    fluticasone propionate (FLONASE) 50 mcg/actuation nasal spray, 1 spray (50 mcg total) by Each Nostril route 2 (two) times a day., Disp: 16 g, Rfl: 11    oxyCODONE (ROXICODONE) 5 MG immediate release tablet, Take 1 tablet (5 mg total) by mouth every 6 (six) hours as needed for Pain., Disp: 15 tablet, Rfl: 0    solifenacin (VESICARE) 5 MG tablet, Take 1 tablet (5 mg total) by mouth once daily., Disp: 30 tablet, Rfl: 0    tamsulosin (FLOMAX) 0.4 mg Cap, Take 1 capsule (0.4 mg total) by mouth once daily., Disp: 30 capsule, Rfl: 0     Physical Exam    General: In no acute distress.  Head: Normocephalic. Non traumatic.  Eyes: PERRLA. EOMs full. Conjunctivae clear. Fundi grossly normal.  Ears: EACs clear. TMs normal.  Nose: Mucosa pink. Mucosa moist. No obstruction.  Throat: Clear. No exudates. No lesions.  Neck: Supple. No masses. No thyromegaly. No bruits.  Chest: Lungs clear. No rales. No rhonchi. No wheezes. Normal lung sounds.  Heart: RRR. No murmurs. No rubs. No gallops.  Abdomen: Soft. No tenderness. No masses. BS normal.  : Normal external genitalia. No lesions. No discharge. No hernias  noted.  Back: Normal curvature. No scoliosis. No tenderness.  Extremities: Warm. Well perfused. No upper extremity edema. No lower extremity edema. FROM. No deformities. No joint erythema.  Neuro: No focal deficits appreciated. Good muscle tone. Normal response to visual stimuli. Normal response to auditory stimuli.  Skin: Normal. No  "rashes. No lesions noted.               VITAL SIGNS:  Vitals:    10/25/24 1525   BP: 112/78   BP Location: Left arm   Patient Position: Sitting   Pulse: 104   Resp: 17   Temp: 97.4 °F (36.3 °C)   TempSrc: Temporal   SpO2: 97%   Weight: 85.2 kg (187 lb 15.1 oz)   Height: 5' 2" (1.575 m)       CURRENT BMI:   Body mass index is 34.38 kg/m².    LABS REVIEWED:    CBC:  Lab Results   Component Value Date    WBC 6.56 06/27/2024    RBC 4.24 06/27/2024    HGB 11.0 (L) 06/27/2024    HCT 37.4 06/27/2024    MCV 88 06/27/2024    MCH 25.9 (L) 06/27/2024    MCHC 29.4 (L) 06/27/2024    RDW 14.4 06/27/2024     06/27/2024    MPV 10.6 06/27/2024    GRAN 3.4 06/27/2024    GRAN 52.0 06/27/2024    LYMPH 2.4 06/27/2024    LYMPH 37.2 06/27/2024    MONO 0.6 06/27/2024    MONO 8.7 06/27/2024    EOS 0.1 06/27/2024    BASO 0.01 06/27/2024    EOSINOPHIL 1.7 06/27/2024    BASOPHIL 0.2 06/27/2024       CHEMISTRY:  Sodium   Date Value Ref Range Status   07/05/2024 141 136 - 145 mmol/L Final     Potassium   Date Value Ref Range Status   07/05/2024 4.0 3.5 - 5.1 mmol/L Final     Chloride   Date Value Ref Range Status   07/05/2024 109 95 - 110 mmol/L Final     CO2   Date Value Ref Range Status   07/05/2024 25 23 - 29 mmol/L Final     Glucose   Date Value Ref Range Status   07/05/2024 84 70 - 110 mg/dL Final     BUN   Date Value Ref Range Status   07/05/2024 15 6 - 20 mg/dL Final     Creatinine   Date Value Ref Range Status   07/05/2024 0.9 0.5 - 1.4 mg/dL Final     Calcium   Date Value Ref Range Status   07/05/2024 10.0 8.7 - 10.5 mg/dL Final     Total Protein   Date Value Ref Range Status   07/05/2024 7.3 6.0 - 8.4 g/dL Final     Albumin   Date Value Ref Range Status   07/05/2024 3.7 3.5 - 5.2 g/dL Final     Total Bilirubin   Date Value Ref Range Status   07/05/2024 0.3 0.1 - 1.0 mg/dL Final     Comment:     For infants and newborns, interpretation of results should be based  on gestational age, weight and in agreement with " clinical  observations.    Premature Infant recommended reference ranges:  Up to 24 hours.............<8.0 mg/dL  Up to 48 hours............<12.0 mg/dL  3-5 days..................<15.0 mg/dL  6-29 days.................<15.0 mg/dL       Alkaline Phosphatase   Date Value Ref Range Status   07/05/2024 90 55 - 135 U/L Final     AST   Date Value Ref Range Status   07/05/2024 12 10 - 40 U/L Final     ALT   Date Value Ref Range Status   07/05/2024 13 10 - 44 U/L Final     Anion Gap   Date Value Ref Range Status   07/05/2024 7 (L) 8 - 16 mmol/L Final     eGFR   Date Value Ref Range Status   07/05/2024 >60.0 >60 mL/min/1.73 m^2 Final       LIPID PANEL:  Lab Results   Component Value Date    CHOL 165 09/28/2023    CHOL 180 07/10/2020     Lab Results   Component Value Date    TRIG 76 09/28/2023    TRIG 107 07/10/2020     Lab Results   Component Value Date    HDL 41 09/28/2023    HDL 43 07/10/2020     Lab Results   Component Value Date    LDLCALC 108.8 09/28/2023    LDLCALC 115.6 07/10/2020       THYROID:  Lab Results   Component Value Date    TSH 2.845 09/28/2023    FREET4 1.03 04/23/2014       DIABETES:  Lab Results   Component Value Date    HGBA1C 6.0 (H) 04/24/2024     Microalb/Creat Ratio   Date Value Ref Range Status   02/14/2019 2.1 0.0 - 30.0 ug/mg Final       Assessment and Plan     1. Annual physical exam  Comments:  Will obtain CBC, CMP,TSH, A1c and lipid panel for regular health monitoring  Orders:  -     CBC Auto Differential; Future; Expected date: 10/25/2024  -     Comprehensive Metabolic Panel; Future; Expected date: 10/25/2024  -     Lipid Panel; Future; Expected date: 10/25/2024  -     Hemoglobin A1C; Future; Expected date: 10/25/2024  -     TSH; Future; Expected date: 10/25/2024    2. Prediabetes  Comments:  Will obtain A1c to monitor  Orders:  -     POCT Glucose, Hand-Held Device    3. Obesity, Class I, BMI 30-34.9  Comments:  BMI 34.38. Recommend dietary changes and exercise        Assessment & Plan     PREDIABETES:  - Suspect prediabetes may be acting up based on patient's reported symptoms of increased thirst, fatigue, and clear urine.  - Ordered point-of-care glucose test to assess current blood sugar and determine if immediate intervention is necessary.  - Ordered point-of-care glucose test, A1C, thyroid function tests, and complete blood count.    CHRONIC HIVES:  - Agree with allergist's assessment of chronic hives, likely due to hyperactive immune system.  - Explained chronic hives as an overreaction of the immune system, not necessarily linked to specific allergens.  - Discussed how chronic hives may wax and wane over time, potentially worsening during seasonal allergy periods.  - Clarified that hives are not typically associated with lupus.  - Continued Allegra as recommended by allergist for management of chronic hives.    FATIGUE:  - Plan to evaluate thyroid function and blood count to investigate potential causes of fatigue.  - Consider stress as a contributing factor to patient's fatigue.    FOLLOW UP:  - Follow up as scheduled on Tuesday.  - Provided work excuse note for Monday.               No follow-ups on file.  This note was generated with the assistance of ambient listening technology. Verbal consent was obtained by the patient and accompanying visitor(s) for the recording of patient appointment to facilitate this note. I attest to having reviewed and edited the generated note for accuracy, though some syntax or spelling errors may persist. Please contact the author of this note for any clarification.

## 2024-10-25 NOTE — LETTER
October 25, 2024      [facility]  [address] [place] [address]  Phone: [phone]  Fax: [phone]       Patient: Rachel Clark   YOB: 1970  Date of Visit: 10/25/2024    To Whom It May Concern:    Zuleima Clark  was at [facility] on 10/25/2024. The patient may return to work/school on  Tuesday 10/29/2024  with no restrictions. If you have any questions or concerns, or if I can be of further assistance, please do not hesitate to contact me.    Sincerely,    Sonia West MA

## 2024-10-28 ENCOUNTER — TELEPHONE (OUTPATIENT)
Dept: FAMILY MEDICINE | Facility: CLINIC | Age: 54
End: 2024-10-28
Payer: COMMERCIAL

## 2025-01-14 ENCOUNTER — TELEPHONE (OUTPATIENT)
Dept: FAMILY MEDICINE | Facility: CLINIC | Age: 55
End: 2025-01-14
Payer: COMMERCIAL

## 2025-01-14 NOTE — TELEPHONE ENCOUNTER
----- Message from Med Assistant Ting sent at 1/14/2025  9:10 AM CST -----  Contact: Rachel  Called pt. And she stated she wants to speak with you about her appointment on 3/26/24 I told her you was in clinic she stated if you have free time can you call her  ----- Message -----  From: Claudia Mancuso  Sent: 1/14/2025   9:07 AM CST  To: Darrick BROWNE Staff    Type:  Patient Requesting a call back     Who Called:Rachel   What is the call back request regarding?:pt would like a call back regarding an appt she had with NP  on 03/26/24   Would the patient rather a call back or a response via [facility]?call   Best Call Back Number: [phone]        Additional Information:

## 2025-01-14 NOTE — TELEPHONE ENCOUNTER
"Returned call to patient.  In regards to the circumstances surrounding her visit with me on 3/26/24, she has retained .  She states that her  may need me to sit on "a panel" or testify, she is not sure.  I have advised her to let him know that he needs to contact our Med-Legal department and they will provide him with further information on how to proceed.  Verb understanding.    ac  "

## 2025-03-13 ENCOUNTER — PATIENT MESSAGE (OUTPATIENT)
Dept: RHEUMATOLOGY | Facility: CLINIC | Age: 55
End: 2025-03-13

## 2025-04-08 PROBLEM — R07.9 CHEST PAIN: Status: RESOLVED | Noted: 2024-05-29 | Resolved: 2025-04-08

## 2025-04-08 NOTE — PROGRESS NOTES
Rachel Clark  MRN:  [id number]  54 y.o. female      Patient Care Team:  Shirley Oviedo, DO as PCP - General (Internal Medicine)  Kunal Mclean OD (Optometry)  Deion Hollingsworth, NP as Nurse Practitioner (Family Medicine)  Emeli Esteban, NP as Nurse Practitioner (Obstetrics)      SUBJECTIVE:     CHIEF COMPLAINT:  - Increased thirst, urination and hunger    HPI:  Ms. Clark reports symptoms for several weeks, including increased thirst, especially at night, increased urination frequency, and constant hunger even after eating. Ms. Clark has noticed unintentional weight loss, with loose-fitting clothes, although her weight remains relatively stable between 185-187 lbs.    Ms. Clark reports consuming a significant amount of sugary soda, specifically Dr. Pepper, approximately 5 per day, maybe more. She also reports frequent consumption of potatoes, particularly french fries. Ms. Clark mentions acid reflux and has been taking OTC medication for it.    Has pre-diabetes, on no med currently.      Review of Systems   Constitutional:  Positive for diaphoresis and malaise/fatigue. Negative for chills and weight loss.   HENT: Negative.     Eyes: Negative.  Negative for blurred vision, double vision and discharge.   Respiratory: Negative.     Cardiovascular: Negative.  Negative for chest pain and palpitations.   Gastrointestinal: Negative.  Negative for blood in stool, constipation, diarrhea, nausea and vomiting.   Genitourinary:  Positive for frequency and urgency. Negative for dysuria and hematuria.   Musculoskeletal:  Positive for joint pain.   Neurological:  Positive for tingling and headaches. Negative for dizziness, tremors, loss of consciousness and weakness.   Endo/Heme/Allergies:  Positive for polydipsia.        + polyuria, polyphagia   Psychiatric/Behavioral:  The patient has insomnia (nocturia vs other cause ???).        Review of patient's allergies indicates:  No Known  "Allergies      Problem List[1]      Current Outpatient Medications   Medication Instructions    albuterol (PROVENTIL HFA) 90 mcg/actuation inhaler 1-2 puffs, Inhalation, Every 6 hours PRN, Rescue         Past medical, surgical, family and social histories have been reviewed today.      OBJECTIVE:     Vitals:    04/09/25 0753   BP: 116/70   Pulse: 68   Temp: 96.5 °F (35.8 °C)   TempSrc: Tympanic   SpO2: 99%   Weight: 84 kg (185 lb 1.2 oz)   Height: 5' 2" (1.575 m)     Vitals:    04/09/25 0753   PainSc: 0-No pain       Physical Exam  Vitals (Complete PE deferred today) reviewed.   HENT:      Head: Normocephalic and atraumatic.   Neurological:      Mental Status: She is alert and oriented to person, place, and time.      Motor: No weakness.      Gait: Gait normal.   Psychiatric:         Mood and Affect: Mood normal.         Behavior: Behavior normal.         Thought Content: Thought content normal.         Judgment: Judgment normal.         ASSESSMENT:     1. Prediabetes ----- chronic issue, current A1c well-controlled.  However, she is having symptoms of hyperglycemia with diet rich in sugary sodas ~ 5 per day and carbs/potatoes.  Diet mod discussed, exercise, reduce weight.  -     Hemoglobin A1C; Future; Expected date: 04/09/2025    Lab Results   Component Value Date    HGBA1C 5.8 (H) 10/25/2024       2. Mild intermittent asthma without complication ---- chronic issue, stable w/out current complaints.  Req refill.  -     albuterol (PROVENTIL HFA) 90 mcg/actuation inhaler; Inhale 1-2 puffs into the lungs every 6 (six) hours as needed for Wheezing or Shortness of Breath. Rescue  Dispense: 18 g; Refill: 2    3. Class 1 obesity with serious comorbidity and body mass index (BMI) of 33.0 to 33.9 in adult, unspecified obesity type ----- chronic issue, essentially stable with mild fluctuations in weight.  Healthy diet and lifestyle changes discussed.    Wt Readings from Last 4 Encounters:   04/09/25 84 kg (185 lb 1.2 oz) "   10/25/24 85.2 kg (187 lb 15.1 oz)   08/02/24 84.1 kg (185 lb 8.3 oz)   07/15/24 84.9 kg (187 lb 2.7 oz)       PLAN:     Last annual exam completed 10/25/24.  Lab today, pending.  RTC as directed and/or prn.        VIVIENNE Bailey  [facility]       30 minutes of total time spent on the encounter, which includes face to face time and non-face to face time preparing to see the patient.  This includes obtaining and/or reviewing separately obtained history, performing a medically appropriate examination and/or evaluation, and counseling and educating the patient/family/caregiver.  Includes documenting clinical information in the electronic or other health record, independently interpreting results (not separately reported) and communicating results to the patient/family/caregiver, with care coordination (not separately reported).  Medications, tests and/or procedures ordered as necessary along with referring and communicating with other health professionals (when not separately reported).             [1]   Patient Active Problem List  Diagnosis    Anemia    Mild intermittent asthma without complication    Anxiety disorder    Trigger index finger of left hand    Prediabetes    Class 1 obesity with serious comorbidity and body mass index (BMI) of 33.0 to 33.9 in adult    Chronic idiopathic urticaria    Chronic rhinitis

## 2025-04-09 ENCOUNTER — RESULTS FOLLOW-UP (OUTPATIENT)
Dept: FAMILY MEDICINE | Facility: CLINIC | Age: 55
End: 2025-04-09

## 2025-04-09 ENCOUNTER — LAB VISIT (OUTPATIENT)
Dept: LAB | Facility: HOSPITAL | Age: 55
End: 2025-04-09
Attending: REGISTERED NURSE
Payer: COMMERCIAL

## 2025-04-09 ENCOUNTER — OFFICE VISIT (OUTPATIENT)
Dept: FAMILY MEDICINE | Facility: CLINIC | Age: 55
End: 2025-04-09
Payer: COMMERCIAL

## 2025-04-09 ENCOUNTER — TELEPHONE (OUTPATIENT)
Dept: FAMILY MEDICINE | Facility: CLINIC | Age: 55
End: 2025-04-09

## 2025-04-09 VITALS
SYSTOLIC BLOOD PRESSURE: 116 MMHG | WEIGHT: 185.06 LBS | OXYGEN SATURATION: 99 % | HEIGHT: 62 IN | TEMPERATURE: 97 F | BODY MASS INDEX: 34.05 KG/M2 | DIASTOLIC BLOOD PRESSURE: 70 MMHG | HEART RATE: 68 BPM

## 2025-04-09 DIAGNOSIS — E66.811 CLASS 1 OBESITY WITH SERIOUS COMORBIDITY AND BODY MASS INDEX (BMI) OF 33.0 TO 33.9 IN ADULT, UNSPECIFIED OBESITY TYPE: ICD-10-CM

## 2025-04-09 DIAGNOSIS — J45.20 MILD INTERMITTENT ASTHMA WITHOUT COMPLICATION: ICD-10-CM

## 2025-04-09 DIAGNOSIS — R73.03 PREDIABETES: Primary | ICD-10-CM

## 2025-04-09 DIAGNOSIS — R73.03 PREDIABETES: ICD-10-CM

## 2025-04-09 LAB
EAG (OHS): 123 MG/DL (ref 68–131)
HBA1C MFR BLD: 5.9 % (ref 4–5.6)

## 2025-04-09 PROCEDURE — 99214 OFFICE O/P EST MOD 30 MIN: CPT | Mod: S$GLB,,, | Performed by: REGISTERED NURSE

## 2025-04-09 PROCEDURE — 3078F DIAST BP <80 MM HG: CPT | Mod: CPTII,S$GLB,, | Performed by: REGISTERED NURSE

## 2025-04-09 PROCEDURE — 36415 COLL VENOUS BLD VENIPUNCTURE: CPT | Mod: PO

## 2025-04-09 PROCEDURE — 83036 HEMOGLOBIN GLYCOSYLATED A1C: CPT

## 2025-04-09 PROCEDURE — 1159F MED LIST DOCD IN RCRD: CPT | Mod: CPTII,S$GLB,, | Performed by: REGISTERED NURSE

## 2025-04-09 PROCEDURE — G2211 COMPLEX E/M VISIT ADD ON: HCPCS | Mod: S$GLB,,, | Performed by: REGISTERED NURSE

## 2025-04-09 PROCEDURE — 3008F BODY MASS INDEX DOCD: CPT | Mod: CPTII,S$GLB,, | Performed by: REGISTERED NURSE

## 2025-04-09 PROCEDURE — 3074F SYST BP LT 130 MM HG: CPT | Mod: CPTII,S$GLB,, | Performed by: REGISTERED NURSE

## 2025-04-09 PROCEDURE — 99999 PR PBB SHADOW E&M-EST. PATIENT-LVL III: CPT | Mod: PBBFAC,,, | Performed by: REGISTERED NURSE

## 2025-04-09 RX ORDER — ALBUTEROL SULFATE 90 UG/1
1-2 INHALANT RESPIRATORY (INHALATION) EVERY 6 HOURS PRN
Qty: 18 G | Refills: 2 | Status: SHIPPED | OUTPATIENT
Start: 2025-04-09 | End: 2026-04-09

## 2025-04-15 ENCOUNTER — TELEPHONE (OUTPATIENT)
Dept: FAMILY MEDICINE | Facility: CLINIC | Age: 55
End: 2025-04-15
Payer: COMMERCIAL

## 2025-04-15 ENCOUNTER — E-VISIT (OUTPATIENT)
Dept: FAMILY MEDICINE | Facility: CLINIC | Age: 55
End: 2025-04-15
Payer: COMMERCIAL

## 2025-04-15 DIAGNOSIS — Z01.30 ENCOUNTER FOR EXAMINATION OF BLOOD PRESSURE WITHOUT ABNORMAL FINDINGS: Primary | ICD-10-CM

## 2025-04-15 NOTE — PROGRESS NOTES
Patient ID: Rachel Clark is a 54 y.o. female.    Chief Complaint: General Illness (Entered automatically based on patient selection in [employer].)    The patient initiated a request through [employer] on 4/15/2025 for evaluation and management with a chief complaint of General Illness (Entered automatically based on patient selection in [employer].)     I evaluated the questionnaire submission on 4/15/2025.    [facility]    4/15/2025  9:39 AM CDT - Filed by Patient   What do you need help with? Other Concern   Do you agree to participate in an E-Visit? Yes   If you have any of the following symptoms, please go to the nearest emergency room or call 911: I acknowledge   Do you have any of the following pregnancy-related conditions? None   What is the main issue you would like addressed today? Blood Pressure   Please describe your symptoms. Bottom number high   Where is your problem located? Pressur   On a scale of 1-10, where 10 is the worst you can imagine, how severe are your symptoms? (range: 1 - 10) 3   Have you had these symptoms before? Yes   How long have you had these symptoms? A few days   What helps with your symptoms? NA   What makes your symptoms feel worse? NA   Are your symptoms related to a condition you currently have? No   Please describe any probable cause for your symptoms. Na   Provide any additional information you feel is important. Na   Please attach any relevant images or files    Are you able to take your vital signs? Yes         Encounter Diagnosis   Name Primary?    Encounter for examination of blood pressure without abnormal findings Yes        PLAN:  Reassurance provided, bp she reports is normal reading.  DASH diet, reg ex, reduce weight & stress.  Hydrate.  Ex 150 min/week.  RTC as directed and/or prn.          10 minutes of total time spent on the encounter, to include non-face to face time, review of chart and testing results, obtaining and/or reviewing  separately obtained history, and documenting clinical information in the electronic or other health record.   Also includes independent interpretation of results (not separately reported) and communicating results to the patient/family/caregiver, with care coordination (not separately reported).

## 2025-04-15 NOTE — TELEPHONE ENCOUNTER
----- Message from Maida sent at 4/15/2025  8:08 AM CDT -----  .Type: Patient Call Back  Who called: Patient  What is the request in detail:  Called in concerning bottom number reading for blood pressure . Please call backCan the clinic reply by [facility]?     Would the patient rather a call back or a response via [facility]?  call Best call back number:.[phone]

## 2025-04-15 NOTE — TELEPHONE ENCOUNTER
----- Message from Minna sent at 4/15/2025  9:30 AM CDT -----  Contact: self  Type:  Needs Medical AdviceWho Called: Rachel Mooreould the patient rather a call back or a response via [facility]? Call Nan Call Back Number: [phone] Information: pt requesting a call back regarding her being unable to complete her e-visit.

## 2025-04-15 NOTE — TELEPHONE ENCOUNTER
Called pt. And she stated her bottom number for her bp was high she is currently setting up an E-Visit

## 2025-05-06 ENCOUNTER — OFFICE VISIT (OUTPATIENT)
Dept: FAMILY MEDICINE | Facility: CLINIC | Age: 55
End: 2025-05-06
Payer: COMMERCIAL

## 2025-05-06 ENCOUNTER — TELEPHONE (OUTPATIENT)
Dept: FAMILY MEDICINE | Facility: CLINIC | Age: 55
End: 2025-05-06

## 2025-05-06 VITALS
HEART RATE: 102 BPM | HEIGHT: 62 IN | DIASTOLIC BLOOD PRESSURE: 70 MMHG | WEIGHT: 184.75 LBS | SYSTOLIC BLOOD PRESSURE: 110 MMHG | TEMPERATURE: 98 F | RESPIRATION RATE: 18 BRPM | OXYGEN SATURATION: 98 % | BODY MASS INDEX: 34 KG/M2

## 2025-05-06 DIAGNOSIS — J06.9 VIRAL URI WITH COUGH: Primary | ICD-10-CM

## 2025-05-06 DIAGNOSIS — R09.89 SINUS SYMPTOM: ICD-10-CM

## 2025-05-06 DIAGNOSIS — R50.9 FEVER AND CHILLS: ICD-10-CM

## 2025-05-06 LAB
CTP QC/QA: YES
SARS-COV-2 RDRP RESP QL NAA+PROBE: NEGATIVE

## 2025-05-06 PROCEDURE — 99999 PR PBB SHADOW E&M-EST. PATIENT-LVL III: CPT | Mod: PBBFAC,,, | Performed by: REGISTERED NURSE

## 2025-05-06 RX ORDER — OMEPRAZOLE 20 MG/1
20 CAPSULE, DELAYED RELEASE ORAL DAILY PRN
COMMUNITY
Start: 2025-04-30 | End: 2025-05-06

## 2025-05-06 RX ORDER — METOPROLOL TARTRATE 25 MG/1
25 TABLET, FILM COATED ORAL 2 TIMES DAILY
COMMUNITY
Start: 2025-04-30 | End: 2025-05-06

## 2025-05-06 RX ORDER — LEVOTHYROXINE SODIUM 88 UG/1
88 TABLET ORAL
COMMUNITY
Start: 2025-04-30 | End: 2025-05-06

## 2025-05-06 NOTE — PROGRESS NOTES
Rachel Clark  MRN:  [id number]  54 y.o. female      Patient Care Team:  Shirley Oviedo,  as PCP - General (Internal Medicine)  Kunal Mclean OD (Optometry)  Deion Hollingsworth, NP as Nurse Practitioner (Family Medicine)  Emeli Esteban, NP as Nurse Practitioner (Obstetrics)      SUBJECTIVE:     CHIEF COMPLAINT:  - Fever and headache    HPI:  Ms. Clark reports body aches and general achiness that started approximately 3 days ago, describing the pain as mild (3/10 on a pain scale). Max temp at onset reported as 102, with intermittent fever since that time. She had a fever of 100.2°F last night with chills, which persisted but was slightly lower at 101°F this morning. She also complains of headaches, nasal irritation with some discharge, and a mild cough with minimal productive sputum. She recently attended a  in [place], which may have exposed her to illness.    For symptom management, she has been taking Claritin, which has not been effective, and Tylenol for fever reduction.       Review of Systems   Constitutional:  Positive for chills, diaphoresis, fever (max 102) and malaise/fatigue.   HENT:  Positive for congestion and sore throat. Negative for ear pain and nosebleeds.    Respiratory:  Positive for cough and sputum production. Negative for hemoptysis, shortness of breath and wheezing.    Cardiovascular: Negative.    Gastrointestinal:  Negative for diarrhea, nausea and vomiting.   Genitourinary: Negative.    Musculoskeletal:  Positive for myalgias.   Neurological:  Positive for headaches. Negative for tingling, tremors and weakness.         Review of patient's allergies indicates:  No Known Allergies      Problem List[1]      Current Outpatient Medications   Medication Instructions    albuterol (PROVENTIL HFA) 90 mcg/actuation inhaler 1-2 puffs, Inhalation, Every 6 hours PRN, Rescue         Past medical, surgical, family and social histories have been reviewed today.      OBJECTIVE:  "    Vitals:    05/06/25 1045   BP: 110/70   Pulse: 102   Resp: 18   Temp: 97.7 °F (36.5 °C)   TempSrc: Tympanic   SpO2: 98%   Weight: 83.8 kg (184 lb 11.9 oz)   Height: 5' 2" (1.575 m)     Vitals:    05/06/25 1045   PainSc: 3 --- generalized       Physical Exam  Vitals reviewed.   Constitutional:       General: She is not in acute distress.  HENT:      Head: Normocephalic and atraumatic.      Right Ear: Tympanic membrane normal.      Left Ear: Tympanic membrane normal.      Nose: Congestion present. No rhinorrhea.      Right Sinus: No maxillary sinus tenderness or frontal sinus tenderness.      Left Sinus: No maxillary sinus tenderness or frontal sinus tenderness.      Mouth/Throat:      Mouth: Mucous membranes are moist.      Pharynx: Oropharynx is clear. No pharyngeal swelling, posterior oropharyngeal erythema or postnasal drip.   Eyes:      Conjunctiva/sclera: Conjunctivae normal.      Pupils: Pupils are equal, round, and reactive to light.   Cardiovascular:      Rate and Rhythm: Normal rate and regular rhythm.   Pulmonary:      Effort: Pulmonary effort is normal.      Breath sounds: Normal breath sounds.   Musculoskeletal:         General: No swelling or deformity. Normal range of motion.   Lymphadenopathy:      Cervical: No cervical adenopathy.   Skin:     Capillary Refill: Capillary refill takes less than 2 seconds.   Neurological:      Mental Status: She is alert and oriented to person, place, and time.      Sensory: No sensory deficit.      Motor: No weakness.      Coordination: Coordination normal.      Gait: Gait normal.   Psychiatric:         Mood and Affect: Mood normal.         Behavior: Behavior normal.         Thought Content: Thought content normal.         Judgment: Judgment normal.           Results for orders placed or performed in visit on 05/06/25   POCT COVID-19 Rapid Screening    Collection Time: 05/06/25 11:21 AM   Result Value Ref Range    POC Rapid COVID Negative Negative     " Acceptable Yes        ASSESSMENT:     1. Viral URI with cough ---- acute onset 3 days ago, neg COVID test.  Flu testing not done due to being outside of window to treat.    2. Sinus symptom  -     POCT COVID-19 Rapid Screening    3. Fever and chills  -     POCT COVID-19 Rapid Screening      PLAN:     Avoid steroids due to active fever --- temp 101 this morning.  Symptomatic care, rest, hydration, Vitamin-C.  Contact office back if s/s worsen or persist.  Work note provided to return on Friday 5/9/25.  RTC as directed and/or prn.        VIVIENNE Bailey  [facility]       30 minutes of total time spent on the encounter, which includes face to face time and non-face to face time preparing to see the patient.  This includes obtaining and/or reviewing separately obtained history, performing a medically appropriate examination and/or evaluation, and counseling and educating the patient/family/caregiver.  Includes documenting clinical information in the electronic or other health record, independently interpreting results (not separately reported) and communicating results to the patient/family/caregiver, with care coordination (not separately reported).  Medications, tests and/or procedures ordered as necessary along with referring and communicating with other health professionals (when not separately reported).           [1]   Patient Active Problem List  Diagnosis    Anemia    Mild intermittent asthma without complication    Anxiety disorder    Trigger index finger of left hand    Prediabetes    Class 1 obesity with serious comorbidity and body mass index (BMI) of 33.0 to 33.9 in adult    Chronic idiopathic urticaria    Chronic rhinitis

## 2025-05-07 ENCOUNTER — TELEPHONE (OUTPATIENT)
Dept: RHEUMATOLOGY | Facility: CLINIC | Age: 55
End: 2025-05-07

## 2025-05-07 ENCOUNTER — OFFICE VISIT (OUTPATIENT)
Dept: RHEUMATOLOGY | Facility: CLINIC | Age: 55
End: 2025-05-07
Payer: COMMERCIAL

## 2025-05-07 VITALS
HEART RATE: 96 BPM | BODY MASS INDEX: 34.32 KG/M2 | WEIGHT: 186.5 LBS | SYSTOLIC BLOOD PRESSURE: 118 MMHG | DIASTOLIC BLOOD PRESSURE: 75 MMHG | HEIGHT: 62 IN

## 2025-05-07 DIAGNOSIS — R58 ECCHYMOSIS: ICD-10-CM

## 2025-05-07 DIAGNOSIS — M77.10 LATERAL EPICONDYLITIS, UNSPECIFIED LATERALITY: ICD-10-CM

## 2025-05-07 DIAGNOSIS — G56.03 BILATERAL CARPAL TUNNEL SYNDROME: ICD-10-CM

## 2025-05-07 DIAGNOSIS — M18.0 OSTEOARTHRITIS OF CARPOMETACARPAL (CMC) JOINT OF BOTH THUMBS: Primary | ICD-10-CM

## 2025-05-07 DIAGNOSIS — M77.11 RIGHT LATERAL EPICONDYLITIS: ICD-10-CM

## 2025-05-07 DIAGNOSIS — M16.12 ARTHRITIS OF LEFT HIP: Primary | ICD-10-CM

## 2025-05-07 PROCEDURE — 3074F SYST BP LT 130 MM HG: CPT | Mod: CPTII,S$GLB,, | Performed by: STUDENT IN AN ORGANIZED HEALTH CARE EDUCATION/TRAINING PROGRAM

## 2025-05-07 PROCEDURE — 99999 PR PBB SHADOW E&M-EST. PATIENT-LVL III: CPT | Mod: PBBFAC,,, | Performed by: STUDENT IN AN ORGANIZED HEALTH CARE EDUCATION/TRAINING PROGRAM

## 2025-05-07 PROCEDURE — 1160F RVW MEDS BY RX/DR IN RCRD: CPT | Mod: CPTII,S$GLB,, | Performed by: STUDENT IN AN ORGANIZED HEALTH CARE EDUCATION/TRAINING PROGRAM

## 2025-05-07 PROCEDURE — 3008F BODY MASS INDEX DOCD: CPT | Mod: CPTII,S$GLB,, | Performed by: STUDENT IN AN ORGANIZED HEALTH CARE EDUCATION/TRAINING PROGRAM

## 2025-05-07 PROCEDURE — 3044F HG A1C LEVEL LT 7.0%: CPT | Mod: CPTII,S$GLB,, | Performed by: STUDENT IN AN ORGANIZED HEALTH CARE EDUCATION/TRAINING PROGRAM

## 2025-05-07 PROCEDURE — 1159F MED LIST DOCD IN RCRD: CPT | Mod: CPTII,S$GLB,, | Performed by: STUDENT IN AN ORGANIZED HEALTH CARE EDUCATION/TRAINING PROGRAM

## 2025-05-07 PROCEDURE — 99215 OFFICE O/P EST HI 40 MIN: CPT | Mod: S$GLB,,, | Performed by: STUDENT IN AN ORGANIZED HEALTH CARE EDUCATION/TRAINING PROGRAM

## 2025-05-07 PROCEDURE — 3078F DIAST BP <80 MM HG: CPT | Mod: CPTII,S$GLB,, | Performed by: STUDENT IN AN ORGANIZED HEALTH CARE EDUCATION/TRAINING PROGRAM

## 2025-05-07 RX ORDER — METHYLPREDNISOLONE 4 MG/1
TABLET ORAL
Qty: 21 EACH | Refills: 0 | Status: SHIPPED | OUTPATIENT
Start: 2025-05-07 | End: 2025-05-28

## 2025-05-07 NOTE — PROGRESS NOTES
"Subjective:      Patient ID: Rachel Clark is a 54 y.o. female.    Chief Complaint: polyarthralgia    HPI:   Patient presents for Rheumatology follow up. Has been previously following with ROSE Carter for elevated CRP and polyarthralgia. Initially had pain in elbows. MRI left elbow w/wo contrast showed mild medial and lateral epicondylitis. B/l elbow pain resolved with steroid injections. Currently getting pain at the base of both thumbs. Also getting tingling sensation in the hands along with wrist pain at night. Gets some lower leg pain at the end of the day. Arms overhead for too long feels heavy. Tylenol helps with the pain. Denies getting exercise. Eats a lot of fiber. Eats sugary foods. Denies joint swelling or prolonged stiffness. Denies systemic symptoms. Rheumatology review of systems is otherwise negative.    Objective:   /75 (BP Location: Left arm, Patient Position: Sitting)   Pulse 96   Ht 5' 2" (1.575 m)   Wt 84.6 kg (186 lb 8.2 oz)   LMP 06/01/2014   BMI 34.11 kg/m²   Physical Exam  Constitutional:       General: She is not in acute distress.     Appearance: Normal appearance.   HENT:      Head: Normocephalic and atraumatic.      Mouth/Throat:      Mouth: Mucous membranes are moist.      Pharynx: Oropharynx is clear.   Cardiovascular:      Rate and Rhythm: Normal rate and regular rhythm.   Pulmonary:      Effort: Pulmonary effort is normal.      Breath sounds: Normal breath sounds.   Abdominal:      Palpations: Abdomen is soft.      Tenderness: There is no abdominal tenderness.   Musculoskeletal:         General: No swelling.      Cervical back: Normal range of motion. No tenderness.      Comments: No synovitis, enthesitis, dactylitis, or effusions. +crepitus and pain at b/l CMC joints with thumb movements. Phalen and Tinel signs negative bilaterally. Strength is intact and symmetric in proximal and distal upper and lower extremities.   Skin:     General: Skin is warm and dry. "   Neurological:      Mental Status: She is alert and oriented to person, place, and time. Mental status is at baseline.             Assessment and Plan:     Problem List Items Addressed This Visit    None  Visit Diagnoses         Osteoarthritis of carpometacarpal (CMC) joint of both thumbs    -  Primary      Bilateral carpal tunnel syndrome                Patient presents for Rheumatology follow up for polyarthralgia in the setting of intermittently elevated ESR and CRP. Not found to have a systemic rheumatic disease so far. Has scattered OA in the hands and currently with symptoms of CMC joint arthritis and carpal tunnel syndrome bilaterally.    Negative GLORIA, RF, CCP.  Chronic stable anemia which likely represents her baseline. No other cytopenias.  Stable renal function.  Normal liver enzymes.  No significant proteinuria.    Plan:  Low likelihood of an inflammatory arthritis at this point. Consider enthesitis predominant disease if symptoms recur in the future.  Medrol dosepack for flare of b/l CMC joint arthritis and CTS.  Wear CTS braces nightly for 6 weeks. If no improvement she can be referred to Sports Med or Hand Surgery for injections.  Informed patient that I will be leaving [facility] in June and she will have to establish with an external Rheumatologist for follow up in the next 6 months or as needed if symptoms recur.        I spent a total of 40 minutes on the day of the visit.  This includes face to face time and non-face to face time preparing to see the patient (eg, review of tests), obtaining and/or reviewing separately obtained history, documenting clinical information in the electronic or other health record, independently interpreting results and communicating results to the patient/family/caregiver, or care coordinator.

## 2025-05-07 NOTE — PATIENT INSTRUCTIONS
Carpal tunnel braces. Wear nightly for 6 weeks. If you are still getting numbness, tingling, burning sensations, see Sports Medicine or Hand Surgery.  Medrol dosepack for the carpal tunnel and osteoarthritis at the base of the thumbs.  Take Arthritis strength extended release Tylenol 650 mg 1 tablet 3 times daily as needed for pain.

## 2025-05-08 ENCOUNTER — TELEPHONE (OUTPATIENT)
Dept: RHEUMATOLOGY | Facility: CLINIC | Age: 55
End: 2025-05-08
Payer: COMMERCIAL

## 2025-05-26 ENCOUNTER — HOSPITAL ENCOUNTER (OUTPATIENT)
Dept: RADIOLOGY | Facility: HOSPITAL | Age: 55
Discharge: HOME | End: 2025-05-26
Attending: INTERNAL MEDICINE
Payer: COMMERCIAL

## 2025-05-26 DIAGNOSIS — Z12.31 ENCOUNTER FOR SCREENING MAMMOGRAM FOR BREAST CANCER: ICD-10-CM

## 2025-05-26 PROCEDURE — 77063 BREAST TOMOSYNTHESIS BI: CPT | Mod: 26,,, | Performed by: RADIOLOGY

## 2025-05-26 PROCEDURE — 77067 SCR MAMMO BI INCL CAD: CPT | Mod: 26,,, | Performed by: RADIOLOGY

## 2025-05-26 PROCEDURE — 77067 SCR MAMMO BI INCL CAD: CPT | Mod: TC

## 2025-05-27 ENCOUNTER — RESULTS FOLLOW-UP (OUTPATIENT)
Dept: FAMILY MEDICINE | Facility: CLINIC | Age: 55
End: 2025-05-27
Payer: COMMERCIAL

## 2025-08-24 DIAGNOSIS — J45.20 MILD INTERMITTENT ASTHMA WITHOUT COMPLICATION: ICD-10-CM

## 2025-08-27 RX ORDER — ALBUTEROL SULFATE 90 UG/1
1-2 INHALANT RESPIRATORY (INHALATION) EVERY 6 HOURS PRN
Qty: 54 G | Refills: 0 | Status: SHIPPED | OUTPATIENT
Start: 2025-08-27

## (undated) DEVICE — TOWEL OR DISP STRL BLUE 4/PK

## (undated) DEVICE — UROVIEW 2600/2800

## (undated) DEVICE — DRAPE T CYSTOSCOPY STERILE

## (undated) DEVICE — URETEROSCOPE LITHOVUE REVERSE

## (undated) DEVICE — GOWN POLY REINF X-LONG XL

## (undated) DEVICE — SYR ONLY LUER LOCK 20CC

## (undated) DEVICE — EXTRACTOR TIPLESS 2.4FRX1115CM

## (undated) DEVICE — CLOSURE SKIN STERI STRIP 1/2X4

## (undated) DEVICE — GUIDE WIRE MOTION .035 X 150CM

## (undated) DEVICE — IRRIGATION SET Y-TYPE TUR/BLAD

## (undated) DEVICE — GOWN POLY REINF BRTH SLV XL

## (undated) DEVICE — GLOVE SIGNATURE ESSNTL LTX 7.5

## (undated) DEVICE — SHEATH FLEXOR URET 10.7FRX35CM

## (undated) DEVICE — CATH URETERAL DUAL LUMEN 10FR

## (undated) DEVICE — SOL IRRI STRL WATER 1000ML

## (undated) DEVICE — CONTAINER SPECIMEN OR STER 4OZ

## (undated) DEVICE — BOWL STERILE LARGE 32OZ

## (undated) DEVICE — SOL NACL IRR 3000ML

## (undated) DEVICE — TRAY SKIN SCRUB WET 4 COMPART

## (undated) DEVICE — FIBER QUANTA OPT SDT 272UM

## (undated) DEVICE — SPONGE COTTON TRAY 4X4IN

## (undated) DEVICE — CANISTER SUCTION JUMBO 12L

## (undated) DEVICE — ADHESIVE MASTISOL VIAL 48/BX